# Patient Record
Sex: MALE | Race: WHITE | NOT HISPANIC OR LATINO
[De-identification: names, ages, dates, MRNs, and addresses within clinical notes are randomized per-mention and may not be internally consistent; named-entity substitution may affect disease eponyms.]

---

## 2022-09-30 PROBLEM — Z00.00 ENCOUNTER FOR PREVENTIVE HEALTH EXAMINATION: Status: ACTIVE | Noted: 2022-09-30

## 2022-10-03 ENCOUNTER — APPOINTMENT (OUTPATIENT)
Dept: ORTHOPEDIC SURGERY | Facility: CLINIC | Age: 47
End: 2022-10-03

## 2022-10-03 ENCOUNTER — NON-APPOINTMENT (OUTPATIENT)
Age: 47
End: 2022-10-03

## 2022-10-03 DIAGNOSIS — M75.51 BURSITIS OF RIGHT SHOULDER: ICD-10-CM

## 2022-10-03 PROCEDURE — 99203 OFFICE O/P NEW LOW 30 MIN: CPT

## 2022-10-03 PROCEDURE — 73030 X-RAY EXAM OF SHOULDER: CPT | Mod: RT

## 2022-10-03 NOTE — DISCUSSION/SUMMARY
[de-identified] : I think he strained his rotator cuff. He will ice and heat. He can exercise with activities that don't bother her. If his symptoms fail to improve he will return. Discussed a cortisone injection if we need to discuss an MRI.

## 2022-10-03 NOTE — PHYSICAL EXAM
[de-identified] : Right shoulder shows no warmth or swelling with a full range of motion. There is a negative Neer impingement sign and negative Beltran test. No pain at the acromioclavicular joint. He has a full range of motion he has 5/5 strength. There is no evidence of labral pathology on testing. Neurovascular exam is normal [de-identified] : Right shoulder x-ray 2 views shows no evidence of fracture or dislocation

## 2022-10-03 NOTE — HISTORY OF PRESENT ILLNESS
[de-identified] : Location: Right shoulder- RHD \par Duration: 2 weeks ago-Patient stated that he was lifting a bucket of water and he felt a  tweak in  his right shoulder \par Quality: sharp, achy \par Aggravating factors: exercising, applying pressure \par Treatments: Advil, resting\par Associating symptoms:  slicking sounds, stiffness \par